# Patient Record
Sex: FEMALE | Race: OTHER | HISPANIC OR LATINO | URBAN - METROPOLITAN AREA
[De-identification: names, ages, dates, MRNs, and addresses within clinical notes are randomized per-mention and may not be internally consistent; named-entity substitution may affect disease eponyms.]

---

## 2020-06-18 ENCOUNTER — OUTPATIENT (OUTPATIENT)
Dept: OUTPATIENT SERVICES | Facility: HOSPITAL | Age: 80
LOS: 1 days | Discharge: HOME | End: 2020-06-18

## 2020-06-18 VITALS — WEIGHT: 139.99 LBS | HEIGHT: 64 IN

## 2020-06-18 LAB
ANION GAP SERPL CALC-SCNC: 14 MMOL/L — SIGNIFICANT CHANGE UP (ref 7–14)
ANION GAP SERPL CALC-SCNC: 16 MMOL/L — HIGH (ref 7–14)
BUN SERPL-MCNC: 11 MG/DL — SIGNIFICANT CHANGE UP (ref 10–20)
BUN SERPL-MCNC: 9 MG/DL — LOW (ref 10–20)
CALCIUM SERPL-MCNC: 10.6 MG/DL — HIGH (ref 8.5–10.1)
CALCIUM SERPL-MCNC: 11.3 MG/DL — HIGH (ref 8.5–10.1)
CHLORIDE SERPL-SCNC: 101 MMOL/L — SIGNIFICANT CHANGE UP (ref 98–110)
CHLORIDE SERPL-SCNC: 101 MMOL/L — SIGNIFICANT CHANGE UP (ref 98–110)
CO2 SERPL-SCNC: 20 MMOL/L — SIGNIFICANT CHANGE UP (ref 17–32)
CO2 SERPL-SCNC: 27 MMOL/L — SIGNIFICANT CHANGE UP (ref 17–32)
CREAT SERPL-MCNC: 0.7 MG/DL — SIGNIFICANT CHANGE UP (ref 0.7–1.5)
CREAT SERPL-MCNC: 0.8 MG/DL — SIGNIFICANT CHANGE UP (ref 0.7–1.5)
GLUCOSE SERPL-MCNC: 120 MG/DL — HIGH (ref 70–99)
GLUCOSE SERPL-MCNC: 133 MG/DL — HIGH (ref 70–99)
HCT VFR BLD CALC: 42.2 % — SIGNIFICANT CHANGE UP (ref 37–47)
HCT VFR BLD CALC: 43.3 % — SIGNIFICANT CHANGE UP (ref 37–47)
HGB BLD-MCNC: 14.2 G/DL — SIGNIFICANT CHANGE UP (ref 12–16)
HGB BLD-MCNC: 14.6 G/DL — SIGNIFICANT CHANGE UP (ref 12–16)
MCHC RBC-ENTMCNC: 29.5 PG — SIGNIFICANT CHANGE UP (ref 27–31)
MCHC RBC-ENTMCNC: 29.7 PG — SIGNIFICANT CHANGE UP (ref 27–31)
MCHC RBC-ENTMCNC: 33.6 G/DL — SIGNIFICANT CHANGE UP (ref 32–37)
MCHC RBC-ENTMCNC: 33.7 G/DL — SIGNIFICANT CHANGE UP (ref 32–37)
MCV RBC AUTO: 87.6 FL — SIGNIFICANT CHANGE UP (ref 81–99)
MCV RBC AUTO: 88.2 FL — SIGNIFICANT CHANGE UP (ref 81–99)
NRBC # BLD: 0 /100 WBCS — SIGNIFICANT CHANGE UP (ref 0–0)
NRBC # BLD: 0 /100 WBCS — SIGNIFICANT CHANGE UP (ref 0–0)
PLATELET # BLD AUTO: 194 K/UL — SIGNIFICANT CHANGE UP (ref 130–400)
PLATELET # BLD AUTO: 207 K/UL — SIGNIFICANT CHANGE UP (ref 130–400)
POTASSIUM SERPL-MCNC: 3.3 MMOL/L — LOW (ref 3.5–5)
POTASSIUM SERPL-MCNC: 3.6 MMOL/L — SIGNIFICANT CHANGE UP (ref 3.5–5)
POTASSIUM SERPL-SCNC: 3.3 MMOL/L — LOW (ref 3.5–5)
POTASSIUM SERPL-SCNC: 3.6 MMOL/L — SIGNIFICANT CHANGE UP (ref 3.5–5)
RBC # BLD: 4.82 M/UL — SIGNIFICANT CHANGE UP (ref 4.2–5.4)
RBC # BLD: 4.91 M/UL — SIGNIFICANT CHANGE UP (ref 4.2–5.4)
RBC # FLD: 13.3 % — SIGNIFICANT CHANGE UP (ref 11.5–14.5)
RBC # FLD: 13.3 % — SIGNIFICANT CHANGE UP (ref 11.5–14.5)
SODIUM SERPL-SCNC: 137 MMOL/L — SIGNIFICANT CHANGE UP (ref 135–146)
SODIUM SERPL-SCNC: 142 MMOL/L — SIGNIFICANT CHANGE UP (ref 135–146)
WBC # BLD: 7.38 K/UL — SIGNIFICANT CHANGE UP (ref 4.8–10.8)
WBC # BLD: 8.34 K/UL — SIGNIFICANT CHANGE UP (ref 4.8–10.8)
WBC # FLD AUTO: 7.38 K/UL — SIGNIFICANT CHANGE UP (ref 4.8–10.8)
WBC # FLD AUTO: 8.34 K/UL — SIGNIFICANT CHANGE UP (ref 4.8–10.8)

## 2020-06-18 RX ORDER — CHLORTHALIDONE 50 MG
1 TABLET ORAL
Qty: 0 | Refills: 0 | DISCHARGE

## 2020-06-18 RX ORDER — ASPIRIN/CALCIUM CARB/MAGNESIUM 324 MG
1 TABLET ORAL
Qty: 30 | Refills: 0
Start: 2020-06-18 | End: 2020-07-17

## 2020-06-18 RX ORDER — AMLODIPINE BESYLATE 2.5 MG/1
1 TABLET ORAL
Qty: 0 | Refills: 0 | DISCHARGE

## 2020-06-18 RX ORDER — RIVAROXABAN 15 MG-20MG
1 KIT ORAL
Qty: 60 | Refills: 0
Start: 2020-06-18 | End: 2020-07-17

## 2020-06-18 RX ORDER — LANSOPRAZOLE 15 MG/1
0 CAPSULE, DELAYED RELEASE ORAL
Qty: 0 | Refills: 0 | DISCHARGE

## 2020-06-18 RX ORDER — CLOPIDOGREL BISULFATE 75 MG/1
1 TABLET, FILM COATED ORAL
Qty: 30 | Refills: 0
Start: 2020-06-18 | End: 2020-07-17

## 2020-06-18 NOTE — PROGRESS NOTE ADULT - SUBJECTIVE AND OBJECTIVE BOX
Cardiology Follow up    NATIVIDAD JETT   79y Female  PAST MEDICAL & SURGICAL HISTORY:  Peripheral vascular disease  Combined hyperlipidemia  H/O essential hypertension  No significant past surgical history     HPI:  79yF PMHx of HTN, DLD, peripheral vascular disease with a high risk LARRY with high velocity flow in the R PT indicating stenosis. She has claudication despite medical therapy with pain worse at night with purple discoloration of the right anterior foot. (18 Jun 2020 07:08)    Allergies    No Known Allergies    Intolerances    Patient without complaints.  Denies CP, SOB, palpitations, or dizziness    HR: 63  BP: 117/67  RR: 12  SpO2: 98% on RA    MEDICATIONS  (STANDING):    MEDICATIONS  (PRN):      REVIEW OF SYSTEMS:          CONSTITUTIONAL: No weakness, fevers or chills          EYES/ENT: No visual changes;  No vertigo or throat pain           NECK: No pain or stiffness          RESPIRATORY: No cough, wheezing, hemoptysis          CARDIOVASCULAR: no pain, no BATISTA, no palpitations           GASTROINTESTINAL: No abdominal or epigastric pain. No nausea, vomiting, or hematemesis;           GENITOURINARY: No dysuria, frequency or hematuria          NEUROLOGICAL: No numbness or weakness          SKIN: No itching, rashes    PHYSICAL EXAM:           CONSTITUTIONAL: Well-developed; well-nourished; in no acute distress  	SKIN: warm, dry  	HEAD: Normocephalic; atraumatic  	EYES: PERRL.  	ENT: No nasal discharge, airway clear, mucous membranes moist  	NECK: Supple; non tender.  	CARD: +S1, +S2, no murmurs, gallops, or rubs. Regular rate and rhythm    	RESP: No wheezes, rales or rhonchi. CTA B/L  	ABD: soft ntnd, + BS x 4 quadrants  	EXT: moves all extremities,  no clubbing, cyanosis or edema  	NEURO: Alert and oriented x3, no focal deficits          PSYCH: Cooperative, appropriate          VASCULAR:  + Rad / + PTs / + DPs          EXTREMITY:             Left Groin:  Dressing D/C/I, access site soft, no hematoma, no pain, + pulses, no sign of infection, no numbness  	            LABS:                        14.6   8.34  )-----------( 207      ( 18 Jun 2020 07:14 )             43.3     06-18    142  |  101  |  11  ----------------------------<  133<H>  3.6   |  27  |  0.8    Ca    11.3<H>      18 Jun 2020 07:14    A/P:  I discussed the case with Cardiologist Dr. Diggs and recommend the following:  ACCESS: L femoral artery  CLOSURE: perclose    INTERVENTION  IMPLANTS: EFREN x1 in TPT 3.5 x 38 jameson. Ballon angioplasty performed on the posterior tibial artery                     CBC and BMP at 13:30 pm                   NS IV hydration post Cath  	     Start regimen of Aspirin 81 mg po daily, Plavix 75mg po daily, and Xarelto 2.5 mg po q12h to start 6/19/2020                   Patient given 30 day supply of ( Aspirin 81 mg daily and Plavix 75 mg daily ) to take at home                   Patient agreeing to take DAPT for at least one year or as directed by cardiologist                    Pt given instructions on importance of taking antiplatelet medication or risk acute stent thrombosis/death                   Post cath instructions, access site care and activity restrictions reviewed with patient                     Discussed with patient to return to hospital if experience chest pain, shortness breath, dizziness and site bleeding                   Aggressive risk factor modification, diet counseling, smoking cessation discussed with patient                       Can discharge patient from cardiac standpoint at 16:30 after ambulating without symptoms and access site wnl and blood work reviewed                    Follow up with Cardiology Dr. Diggs in two weeks. Instructed to call and make an appointment Cardiology Follow up    NATIVIDAD JETT   79y Female  PAST MEDICAL & SURGICAL HISTORY:  Peripheral vascular disease  Combined hyperlipidemia  H/O essential hypertension  No significant past surgical history     HPI:  79yF PMHx of HTN, DLD, peripheral vascular disease with a high risk LARRY with high velocity flow in the R PT indicating stenosis. She has claudication despite medical therapy with pain worse at night with purple discoloration of the right anterior foot. (18 Jun 2020 07:08)    Allergies    No Known Allergies    Intolerances    Patient without complaints.  Denies CP, SOB, palpitations, or dizziness    HR: 63  BP: 117/67  RR: 12  SpO2: 98% on RA    MEDICATIONS  (STANDING):    MEDICATIONS  (PRN):      REVIEW OF SYSTEMS:          CONSTITUTIONAL: No weakness, fevers or chills          EYES/ENT: No visual changes;  No vertigo or throat pain           NECK: No pain or stiffness          RESPIRATORY: No cough, wheezing, hemoptysis          CARDIOVASCULAR: no pain, no BATISTA, no palpitations           GASTROINTESTINAL: No abdominal or epigastric pain. No nausea, vomiting, or hematemesis;           GENITOURINARY: No dysuria, frequency or hematuria          NEUROLOGICAL: No numbness or weakness          SKIN: No itching, rashes    PHYSICAL EXAM:           CONSTITUTIONAL: Well-developed; well-nourished; in no acute distress  	SKIN: warm, dry  	HEAD: Normocephalic; atraumatic  	EYES: PERRL.  	ENT: No nasal discharge, airway clear, mucous membranes moist  	NECK: Supple; non tender.  	CARD: +S1, +S2, no murmurs, gallops, or rubs. Regular rate and rhythm    	RESP: No wheezes, rales or rhonchi. CTA B/L  	ABD: soft ntnd, + BS x 4 quadrants  	EXT: moves all extremities,  no clubbing, cyanosis or edema  	NEURO: Alert and oriented x3, no focal deficits          PSYCH: Cooperative, appropriate          VASCULAR:  + Rad / + PTs / + DPs          EXTREMITY:             Left Groin:  Dressing D/C/I, access site soft, no hematoma, no pain, + pulses, no sign of infection, no numbness  	            LABS:                        14.6   8.34  )-----------( 207      ( 18 Jun 2020 07:14 )             43.3     06-18    142  |  101  |  11  ----------------------------<  133<H>  3.6   |  27  |  0.8    Ca    11.3<H>      18 Jun 2020 07:14    A/P:  I discussed the case with Cardiologist Dr. Diggs and recommend the following:  ACCESS: L femoral artery  CLOSURE: perclose    INTERVENTION  IMPLANTS: EFREN x1 in TPT 3.5 x 38 jameson. Ballon angioplasty performed on the posterior tibial artery                     CBC and BMP at 13:30 pm                   NS IV hydration post Cath                   Xarelto 2.5 mg PO q12 hr costs 3.90 $ per month   	     Start regimen of Aspirin 81 mg po daily, Plavix 75mg po daily, and Xarelto 2.5 mg po q12h to start 6/19/2020                   Patient given 30 day supply of ( Aspirin 81 mg daily and Plavix 75 mg daily ) to take at home                   Patient agreeing to take DAPT for at least one year or as directed by cardiologist                    Pt given instructions on importance of taking antiplatelet medication or risk acute stent thrombosis/death                   Post cath instructions, access site care and activity restrictions reviewed with patient                     Discussed with patient to return to hospital if experience chest pain, shortness breath, dizziness and site bleeding                   Aggressive risk factor modification, diet counseling, smoking cessation discussed with patient                       Can discharge patient from cardiac standpoint at 16:30 after ambulating without symptoms and access site wnl and blood work reviewed                    Follow up with Cardiology Dr. Diggs in two weeks. Instructed to call and make an appointment

## 2020-06-18 NOTE — CHART NOTE - NSCHARTNOTEFT_GEN_A_CORE
PRE-OP DIAGNOSIS: CLI of right lower extremity    PROCEDURE: peripheral angiography with PCI    Physician: Ralph Diggs  Assistant: Clayton    ANESTHESIA TYPE:  [  ]General Anesthesia  [ x ] Sedation  [x  ] Local/Regional    ESTIMATED BLOOD LOSS:    10   mL    CONDITION  [  ] Critical  [  ] Serious  [  ]Fair  [ x ]Good      SPECIMENS REMOVED (IF APPLICABLE): N/A      IV CONTRAST:        170     mL      IMPLANTS (IF APPLICABLE)      FINDINGS    R common femoral artery: mild diffuse disease  R SFA: minor luminal irregularities  R profunda: minor luminal irregularities  R tibial peroneal trunk: diffuse 80% stenosis. PCI performed  R peroneal artery: 60% stenosis diffuse in the mid portion of the vessel  R POsterior tibial artery: severe diffuse PAD from the proximal portion to distal just prior to the pedal arch. POBA performed  R anterior tibial artery: mild diffuse disease.       ACCESS: L femoral artery  CLOSURE: perclose    INTERVENTION  IMPLANTS: EFREN x1 in TPT 3.5 x 38 jameson. Ballon angioplasty performed on the posterior tibial artery    POST-OP DIAGNOSIS: poor inflow secondary to suspected cardiomyopathy/ AI. Severe stenosis of the TPT trunk with PT severe diffuse disease.           PLAN OF CARE  [ x] D/C Home today  [ ]  D/C in AM  [ ] Return to In-patient bed  [ ] Admit for observation  [ ] Return for staged procedure:  [ ] CT Surgery consult called  [ ]  Continue DAPT, B-blocker & Statin therapy    Will start a regimen of aspirin 81 mg po daily, PLavix 75mg po daily, and xarelto 2.5 mg po q12h to start tomorrow.   Discussed with patient, and cardiac NP team.     Results of procedure/ plan of care discussed with patient/  in detail.

## 2020-06-23 DIAGNOSIS — L81.8 OTHER SPECIFIED DISORDERS OF PIGMENTATION: ICD-10-CM

## 2020-06-23 DIAGNOSIS — E78.5 HYPERLIPIDEMIA, UNSPECIFIED: ICD-10-CM

## 2020-06-23 DIAGNOSIS — I70.211 ATHEROSCLEROSIS OF NATIVE ARTERIES OF EXTREMITIES WITH INTERMITTENT CLAUDICATION, RIGHT LEG: ICD-10-CM

## 2020-06-23 DIAGNOSIS — M79.604 PAIN IN RIGHT LEG: ICD-10-CM

## 2020-06-23 DIAGNOSIS — I10 ESSENTIAL (PRIMARY) HYPERTENSION: ICD-10-CM
